# Patient Record
Sex: FEMALE | Race: WHITE | Employment: PART TIME | URBAN - METROPOLITAN AREA
[De-identification: names, ages, dates, MRNs, and addresses within clinical notes are randomized per-mention and may not be internally consistent; named-entity substitution may affect disease eponyms.]

---

## 2017-07-19 RX ORDER — LEVOTHYROXINE SODIUM 125 UG/1
TABLET ORAL
Qty: 30 TAB | Refills: 0 | Status: SHIPPED | OUTPATIENT
Start: 2017-07-19 | End: 2017-08-17 | Stop reason: SDUPTHER

## 2017-10-10 RX ORDER — LEVOTHYROXINE SODIUM 125 UG/1
TABLET ORAL
Qty: 30 TAB | Refills: 0 | Status: SHIPPED | OUTPATIENT
Start: 2017-10-10 | End: 2017-11-11 | Stop reason: SDUPTHER

## 2017-11-06 ENCOUNTER — OFFICE VISIT (OUTPATIENT)
Dept: INTERNAL MEDICINE CLINIC | Age: 47
End: 2017-11-06

## 2017-11-06 VITALS
TEMPERATURE: 98 F | HEART RATE: 58 BPM | SYSTOLIC BLOOD PRESSURE: 113 MMHG | WEIGHT: 152.2 LBS | RESPIRATION RATE: 14 BRPM | OXYGEN SATURATION: 97 % | DIASTOLIC BLOOD PRESSURE: 66 MMHG | HEIGHT: 70 IN | BODY MASS INDEX: 21.79 KG/M2

## 2017-11-06 DIAGNOSIS — E03.4 HYPOTHYROIDISM DUE TO ACQUIRED ATROPHY OF THYROID: Primary | Chronic | ICD-10-CM

## 2017-11-06 DIAGNOSIS — N94.3 PMS (PREMENSTRUAL SYNDROME): ICD-10-CM

## 2017-11-06 RX ORDER — FLUOXETINE 10 MG/1
10 TABLET ORAL DAILY
Qty: 30 TAB | Refills: 3 | Status: SHIPPED | OUTPATIENT
Start: 2017-11-06 | End: 2019-12-04 | Stop reason: ALTCHOICE

## 2017-11-06 NOTE — PROGRESS NOTES
HISTORY OF PRESENT ILLNESS  Kristopher Luna is a 56 y.o. female. HPI   Patient reports she has been adjusting to new job.   hypothyroidism. Lab Results   Component Value Date/Time    TSH 0.296 10/20/2016 03:27 AM     Thyroid ROS: denies fatigue, heat/cold intolerance, bowel/skin changes or CVS symptoms. She notes she has gained some weight. Patient notes she has stopped taking Prozac. She believes her moods are heavily influenced by her menstrual cycles. Patient notes her mood is much better when her period starts, but the few days prior she is irritable. Patient follows up with Dr. Selma Barakat ([address] [place] [address]) and had PAP 6 months ago. She notes she had mammogram last year. Patient refuses flu vaccine today in office. Review of Systems   All other systems reviewed and are negative. Physical Exam   Constitutional: She is oriented to person, place, and time. She appears well-developed and well-nourished. HENT:   Head: Normocephalic and atraumatic. Right Ear: External ear normal.   Left Ear: External ear normal.   Nose: Nose normal.   Mouth/Throat: Oropharynx is clear and moist.   Eyes: Conjunctivae and EOM are normal.   Neck: Normal range of motion. Neck supple. Carotid bruit is not present. No thyroid mass and no thyromegaly present. Cardiovascular: Normal rate, regular rhythm, S1 normal, S2 normal, normal heart sounds and intact distal pulses. Pulmonary/Chest: Effort normal and breath sounds normal.   Abdominal: Soft. Normal appearance and bowel sounds are normal. There is no hepatosplenomegaly. There is no tenderness. Musculoskeletal: Normal range of motion. Neurological: She is alert and oriented to person, place, and time. She has normal strength. No cranial nerve deficit or sensory deficit. Coordination normal.   Skin: Skin is warm, dry and intact. No abrasion and no rash noted. Psychiatric: She has a normal mood and affect.  Her behavior is normal. Judgment and thought content normal.   Nursing note and vitals reviewed. ASSESSMENT and PLAN  Diagnoses and all orders for this visit:    1. Hypothyroidism due to acquired atrophy of thyroid  Stable. I do not recommend a change in medications.  -     METABOLIC PANEL, COMPREHENSIVE  -     LIPID PANEL  -     T4, FREE  -     TSH 3RD GENERATION  -     CBC W/O DIFF    2. PMS (premenstrual syndrome)  Instructed patient to take Prozac 3 days prior to first day of period and stop when period comes.   -     FLUoxetine (PROZAC) 10 mg tablet; Take 1 Tab by mouth daily. lab results and schedule of future lab studies reviewed with patient  reviewed diet, exercise and weight control    Written by Abilio Rebollar, as dictated by Noah Naqvi MD.     Current diagnosis and concerns discussed with pt at length. Understands risks and benefits or current treatment plan and medications and accepts the treatment and medication with any possible risks. Pt asks appropriate questions which were answered. Pt instructed to call with any concerns or problems.

## 2017-11-07 ENCOUNTER — TELEPHONE (OUTPATIENT)
Dept: INTERNAL MEDICINE CLINIC | Age: 47
End: 2017-11-07

## 2017-11-07 LAB
ALBUMIN SERPL-MCNC: 4.2 G/DL (ref 3.5–5.5)
ALBUMIN/GLOB SERPL: 1.8 {RATIO} (ref 1.2–2.2)
ALP SERPL-CCNC: 44 IU/L (ref 39–117)
ALT SERPL-CCNC: 12 IU/L (ref 0–32)
AST SERPL-CCNC: 13 IU/L (ref 0–40)
BILIRUB SERPL-MCNC: 0.8 MG/DL (ref 0–1.2)
BUN SERPL-MCNC: 14 MG/DL (ref 6–24)
BUN/CREAT SERPL: 18 (ref 9–23)
CALCIUM SERPL-MCNC: 9.2 MG/DL (ref 8.7–10.2)
CHLORIDE SERPL-SCNC: 101 MMOL/L (ref 96–106)
CHOLEST SERPL-MCNC: 148 MG/DL (ref 100–199)
CO2 SERPL-SCNC: 24 MMOL/L (ref 18–29)
CREAT SERPL-MCNC: 0.78 MG/DL (ref 0.57–1)
ERYTHROCYTE [DISTWIDTH] IN BLOOD BY AUTOMATED COUNT: 12.3 % (ref 12.3–15.4)
GFR SERPLBLD CREATININE-BSD FMLA CKD-EPI: 105 ML/MIN/1.73
GFR SERPLBLD CREATININE-BSD FMLA CKD-EPI: 91 ML/MIN/1.73
GLOBULIN SER CALC-MCNC: 2.3 G/DL (ref 1.5–4.5)
GLUCOSE SERPL-MCNC: 106 MG/DL (ref 65–99)
HCT VFR BLD AUTO: 38.5 % (ref 34–46.6)
HDLC SERPL-MCNC: 68 MG/DL
HGB BLD-MCNC: 13 G/DL (ref 11.1–15.9)
INTERPRETATION, 910389: NORMAL
LDLC SERPL CALC-MCNC: 70 MG/DL (ref 0–99)
MCH RBC QN AUTO: 31.9 PG (ref 26.6–33)
MCHC RBC AUTO-ENTMCNC: 33.8 G/DL (ref 31.5–35.7)
MCV RBC AUTO: 94 FL (ref 79–97)
PLATELET # BLD AUTO: 216 X10E3/UL (ref 150–379)
POTASSIUM SERPL-SCNC: 5.3 MMOL/L (ref 3.5–5.2)
PROT SERPL-MCNC: 6.5 G/DL (ref 6–8.5)
RBC # BLD AUTO: 4.08 X10E6/UL (ref 3.77–5.28)
SODIUM SERPL-SCNC: 140 MMOL/L (ref 134–144)
T4 FREE SERPL-MCNC: 1.84 NG/DL (ref 0.82–1.77)
TRIGL SERPL-MCNC: 48 MG/DL (ref 0–149)
TSH SERPL DL<=0.005 MIU/L-ACNC: 0.1 UIU/ML (ref 0.45–4.5)
VLDLC SERPL CALC-MCNC: 10 MG/DL (ref 5–40)
WBC # BLD AUTO: 5.8 X10E3/UL (ref 3.4–10.8)

## 2017-11-07 NOTE — TELEPHONE ENCOUNTER
----- Message from Talya Nassar sent at 11/7/2017  3:32 PM EST -----  Regarding: /Telephone  Pt requesting test results on 11/6/17. Best contact number is [phone].

## 2017-11-07 NOTE — TELEPHONE ENCOUNTER
Spoke with patient and she was very upset that the blood work she got completed yesterday 11/6/17 has not been resulted and called to her for results. She stated this gave her anxiety and it should not take us more then 24 hours to have the results back to her. I explained Dr. Brooke Burdick is out of the office for the rest of the day and we will contact her in the morning and she states it was ridiculous she had to wait another 24 hours to get these results.

## 2017-11-08 DIAGNOSIS — E03.4 HYPOTHYROIDISM DUE TO ACQUIRED ATROPHY OF THYROID: Primary | Chronic | ICD-10-CM

## 2019-06-03 ENCOUNTER — OFFICE VISIT (OUTPATIENT)
Dept: INTERNAL MEDICINE CLINIC | Age: 49
End: 2019-06-03

## 2019-06-03 VITALS
BODY MASS INDEX: 20.81 KG/M2 | DIASTOLIC BLOOD PRESSURE: 86 MMHG | RESPIRATION RATE: 18 BRPM | WEIGHT: 145.4 LBS | HEIGHT: 70 IN | TEMPERATURE: 98.2 F | SYSTOLIC BLOOD PRESSURE: 133 MMHG | HEART RATE: 61 BPM | OXYGEN SATURATION: 99 %

## 2019-06-03 DIAGNOSIS — E03.4 HYPOTHYROIDISM DUE TO ACQUIRED ATROPHY OF THYROID: ICD-10-CM

## 2019-06-03 DIAGNOSIS — F41.9 ANXIETY: Primary | ICD-10-CM

## 2019-06-03 RX ORDER — LEVOTHYROXINE SODIUM 112 UG/1
TABLET ORAL
COMMUNITY
End: 2019-12-04 | Stop reason: ALTCHOICE

## 2019-06-03 RX ORDER — ALPRAZOLAM 0.5 MG/1
0.5 TABLET ORAL
Qty: 20 TAB | Refills: 0 | Status: SHIPPED | OUTPATIENT
Start: 2019-06-03 | End: 2019-12-04 | Stop reason: ALTCHOICE

## 2019-06-03 RX ORDER — FLUOXETINE 10 MG/1
10 TABLET ORAL DAILY
Qty: 30 TAB | Refills: 3 | Status: SHIPPED | OUTPATIENT
Start: 2019-06-03 | End: 2019-06-24

## 2019-06-03 NOTE — PROGRESS NOTES
HISTORY OF PRESENT ILLNESS  Champ Flood is a 53 y.o. female. HPI  Anxiety: Pt has been feeling depressed and anxious for a couple months. She recently  her  and has the kids during the week. She notes her   \"decided to pay her more rather than have the kids. 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 She is having a hard time managing work and kids. She is also concerned about her ex-, specifically about weight loss and general health. Pt reports feeling like she always feels worried, is in fight or flight mode, and is not eating. She has been seeing a therapist, but notes \"nothing can help the years of trauma from living with her parents. \"     Hypothyroidism:  Lab Results   Component Value Date/Time    TSH 0.101 (L) 11/06/2017 08:22 AM     Thyroid ROS: denies fatigue, weight changes, heat/cold intolerance, bowel/skin changes or CVS symptoms. Review of Systems   Constitutional:        Decreased appetite    Psychiatric/Behavioral: Positive for depression. The patient is nervous/anxious. All other systems reviewed and are negative. Physical Exam   Constitutional: She is oriented to person, place, and time. She appears well-developed and well-nourished. HENT:   Head: Normocephalic and atraumatic. Right Ear: External ear normal.   Left Ear: External ear normal.   Nose: Nose normal.   Mouth/Throat: Oropharynx is clear and moist.   Eyes: Conjunctivae and EOM are normal.   Neck: Normal range of motion. Neck supple. Carotid bruit is not present. No thyroid mass and no thyromegaly present. Cardiovascular: Normal rate, regular rhythm, S1 normal, S2 normal, normal heart sounds and intact distal pulses. Pulmonary/Chest: Effort normal and breath sounds normal.   Abdominal: Soft. Normal appearance and bowel sounds are normal. There is no hepatosplenomegaly. There is no tenderness. Musculoskeletal: Normal range of motion.    Neurological: She is alert and oriented to person, place, and time. She has normal strength. No cranial nerve deficit or sensory deficit. Coordination normal.   Skin: Skin is warm, dry and intact. No abrasion and no rash noted. Psychiatric: She has a normal mood and affect. Her behavior is normal. Judgment and thought content normal.   Nursing note and vitals reviewed. ASSESSMENT and PLAN  Diagnoses and all orders for this visit:    1. Anxiety   I prescribed xanax and Prozac. I told her the Prozac will take a few weeks to reach maximum effect. I told her to take the Xanax until the Prozac has time to kick in.   -     ALPRAZolam (XANAX) 0.5 mg tablet; Take 1 Tab by mouth nightly as needed for Anxiety. Max Daily Amount: 0.5 mg.  -     FLUoxetine (PROZAC) 10 mg tablet; Take 1 Tab by mouth daily. 2. Hypothyroidism due to acquired atrophy of thyroid  Thyroid stable. I do not recommend a change in medications. We did not check labs today due to discussing anxiety and other meds but will do in follow up     Written by Reid Snowden, as dictated by Maria M Young MD.     Lab results and schedule of future lab studies reviewed with patient. Reviewed diet, exercise and weight control. Current diagnosis and concerns discussed with pt at length. Understands risks and benefits or current treatment plan and medications and accepts the treatment and medication with any possible risks. Pt asks appropriate questions which were answered. Pt instructed to call with any concerns or problems. This note will not be viewable in [address]

## 2019-06-24 DIAGNOSIS — F41.9 ANXIETY: ICD-10-CM

## 2019-06-24 RX ORDER — FLUOXETINE 20 MG/1
20 TABLET ORAL DAILY
Qty: 30 TAB | Refills: 2 | Status: SHIPPED | OUTPATIENT
Start: 2019-06-24 | End: 2019-12-04 | Stop reason: ALTCHOICE

## 2019-11-21 ENCOUNTER — OFFICE VISIT (OUTPATIENT)
Dept: INTERNAL MEDICINE CLINIC | Age: 49
End: 2019-11-21

## 2019-11-21 NOTE — PROGRESS NOTES
Was scheduled too early for her Preoperative clearance physical for surgery 1/3/2020; is returning to office on 12/4/19

## 2019-12-04 ENCOUNTER — OFFICE VISIT (OUTPATIENT)
Dept: INTERNAL MEDICINE CLINIC | Age: 49
End: 2019-12-04

## 2019-12-04 VITALS
TEMPERATURE: 97.9 F | OXYGEN SATURATION: 98 % | RESPIRATION RATE: 12 BRPM | BODY MASS INDEX: 21.82 KG/M2 | SYSTOLIC BLOOD PRESSURE: 121 MMHG | WEIGHT: 152.4 LBS | HEIGHT: 70 IN | DIASTOLIC BLOOD PRESSURE: 81 MMHG | HEART RATE: 63 BPM

## 2019-12-04 DIAGNOSIS — Z41.1 ENCOUNTER FOR BREAST AUGMENTATION: Primary | ICD-10-CM

## 2019-12-04 DIAGNOSIS — E03.4 HYPOTHYROIDISM DUE TO ACQUIRED ATROPHY OF THYROID: ICD-10-CM

## 2019-12-04 DIAGNOSIS — Z01.818 PREOP GENERAL PHYSICAL EXAM: ICD-10-CM

## 2019-12-04 RX ORDER — LEVOTHYROXINE SODIUM 112 UG/1
112 TABLET ORAL
COMMUNITY

## 2019-12-04 NOTE — PROGRESS NOTES
HISTORY OF PRESENT ILLNESS  Ashlee Ozuna is a 51 y.o. female. HPI  Aslhee Ozuna was referred for evaluation by: Dr. Nathaniel Maxwell for Pre- Op Evaluation. Please see encounter details and orders for consultative summary. Type of surgery : Bilateral Breast Augmentation with Saline Implants  Surgery site : Bilateral breasts  Anesthesia type: General   Date of procedure:  1/3/2020    Allergies: Allergies   Allergen Reactions    Latex Other (comments)     Developed red rash around mouth after dental work     Latex allergy: yes -- recalls developing red rash around her mouth after exposure to latex gloves during dental work in past.  Prior reactions to anesthesia:  None    Functional status:  she is able to ambulate up 2 flights of step without shortness of breath, chest pain  Prior cardiac evaluation:   None    Sees Dr Coley Holstein with [address] [place] [address] [facility] for management of her thyroid; her dose was recently decreased due to lower TSH and is now taking Synthroid 125 mcg 6 days per week and Synthroid 112 mcg on Sundays. Has been feeling well since medication dosage change. Current Outpatient Medications   Medication Sig    levothyroxine (SYNTHROID) 112 mcg tablet Take 112 mcg by mouth every .  levothyroxine (SYNTHROID) 125 mcg tablet TAKE 1 TABLET BY MOUTH EVERY MORNING BEFORE BREAKFAST     No current facility-administered medications for this visit. Past Medical History:   Diagnosis Date    Psoriasis     Unspecified hypothyroidism 2009     Past Surgical History:   Procedure Laterality Date    HX  SECTION      x 2    HX HEENT      wisdom teeth extraction     Social History     Tobacco Use    Smoking status: Former Smoker     Packs/day: 0.25     Years: 10.00     Pack years: 2.50     Last attempt to quit: 2002     Years since quittin.3    Smokeless tobacco: Never Used   Substance Use Topics    Alcohol use:  Yes     Alcohol/week: 0.0 - 0.8 standard drinks     Comment: socially    Drug use: No       Blood pressure 121/81, pulse 63, temperature 97.9 °F (36.6 °C), temperature source Oral, resp. rate 12, height 5' 10\" (1.778 m), weight 152 lb 6.4 oz (69.1 kg), last menstrual period 11/19/2019, SpO2 98 %. Review of Systems   Constitutional: Negative for chills, fever and malaise/fatigue. HENT: Negative for congestion and sore throat. Eyes: Negative for blurred vision. Respiratory: Negative for cough, hemoptysis and shortness of breath. Cardiovascular: Negative for chest pain, palpitations and leg swelling. Gastrointestinal: Negative for abdominal pain, blood in stool, constipation, diarrhea, nausea and vomiting. Genitourinary: Negative for dysuria, frequency and hematuria. Musculoskeletal: Negative for joint pain and myalgias. Neurological: Negative for dizziness, tingling and headaches. Physical Exam  Vitals signs and nursing note reviewed. Constitutional:       Appearance: Normal appearance. She is normal weight. HENT:      Head: Normocephalic and atraumatic. Right Ear: Tympanic membrane and external ear normal.      Left Ear: Tympanic membrane and external ear normal.      Nose: Nose normal.      Mouth/Throat:      Mouth: Mucous membranes are moist.      Pharynx: Oropharynx is clear. Eyes:      Extraocular Movements: Extraocular movements intact. Pupils: Pupils are equal, round, and reactive to light. Neck:      Musculoskeletal: Normal range of motion and neck supple. Cardiovascular:      Rate and Rhythm: Normal rate and regular rhythm. Pulses: Normal pulses. Heart sounds: Normal heart sounds. No murmur. Pulmonary:      Effort: Pulmonary effort is normal.      Breath sounds: Normal breath sounds. Abdominal:      General: Abdomen is flat. Bowel sounds are normal.      Palpations: Abdomen is soft. Tenderness: There is no tenderness. Musculoskeletal: Normal range of motion.    Skin: General: Skin is warm and dry. Neurological:      General: No focal deficit present. Mental Status: She is alert and oriented to person, place, and time. Psychiatric:         Mood and Affect: Mood normal.         Behavior: Behavior normal.         ASSESSMENT and PLAN  Diagnoses and all orders for this visit:    1. Encounter for breast augmentation    2. Preop general physical exam -- considered medically stable for upcoming Bilateral breast augmentation surgery with General anesthesia.     3. Hypothyroidism due to acquired atrophy of thyroid -- stable      lab results and schedule of future lab studies reviewed with patient  reviewed diet, exercise and weight control  reviewed medications and side effects in detail

## 2019-12-04 NOTE — PATIENT INSTRUCTIONS
Learning About How to Prepare for Surgery  How can you prepare before surgery? You can do some things that will help you safely prepare for surgery. · Understand exactly what surgery is planned. You should know the risks, benefits, and other options. · Tell your doctors ALL the medicines, vitamins, supplements, and herbal remedies you take. Some of these can increase the risk of bleeding. Or they may interact with anesthesia. · Follow your doctor's instructions about which medicines to take or stop before your surgery. ? You may need to stop taking some medicines a week or more before surgery. ? If you take aspirin or some other blood thinner, be sure to talk to your doctor. · Follow any other instructions your doctor gave you. · If you have an advance directive, let your doctor know, and bring a copy to the hospital.   It may include a living will and a durable power of  for health care. It lets your doctor and loved ones know your health care wishes. If you don't have one, you may want to prepare one. How can you prepare on the day of surgery? Here are some tips about what to do at home before you leave for your surgery. · If your doctor told you to take your medicines on the day of surgery, take them with only a sip of water. · Follow the instructions about when to stop eating and drinking. If you don't, your surgery may be canceled. · Follow your doctor's instructions about when to bathe or shower before your surgery. · Do not shave the surgical site yourself. · Take off all jewelry and piercings. · Take out contact lenses, if you wear them. · Have a picture ID ready to take with you. Your ID will be checked before your surgery. · Know when to call your doctor. Call your doctor if you:  ? Become ill before surgery. ? Need to reschedule. ? Have changed your mind about having the surgery. What happens before surgery?   Here are some things you can expect to happen before your surgery. · Your picture ID will be checked. · The area of your body that needs surgery is often marked to make sure there are no errors. · You will be kept comfortable and safe by your anesthesia provider. The anesthesia may make you sleep. Or it may just numb the area being worked on. What happens when you are ready to go home? Be sure you have someone drive you home. Anesthesia and pain medicine make it unsafe for you to drive. You will get instructions about recovering from your surgery. This is called a discharge plan. It will cover things like diet, wound care, follow-up care, driving, and getting back to your normal routine. Follow-up care is a key part of your treatment and safety. Be sure to make and go to all appointments, and call your doctor if you are having problems. It's also a good idea to know your test results and keep a list of the medicines you take. Where can you learn more? Go to http://aileenAptalis Pharmasunil.info/. Enter Q270 in the search box to learn more about \"Learning About How to Prepare for Surgery. \"  Current as of: December 13, 2018  Content Version: 12.2  © 5890-9097 [employer]. Care instructions adapted under license by [employer] (which disclaims liability or warranty for this information). If you have questions about a medical condition or this instruction, always ask your healthcare professional. [address] any warranty or liability for your use of this information. Breast Enlargement: Before Your Surgery  What is breast enlargement? In breast enlargement surgery, the doctor makes the breasts larger by putting an implant under the breast tissue and often under the chest muscle. An implant is a soft silicone shell filled with a saltwater solution or a gel. Your doctor will make a cut, called an incision.  It will be in the bottom crease of the breast, the armpit, or along the lower edge of the nipple and the dark area around the nipple. The doctor will make a pocket under the muscle or breast tissue. Then the doctor will put in the implant and adjust it to the correct shape, size, and position. The incision is closed with stitches. You may have a breast lift at the same time as the breast enlargement. A breast lift is also called mastopexy. It can raise sagging or drooping breasts. It can also pull up the nipple and the area around it. To lift the breasts, the doctor removes excess skin from the bottom of the breast or from around the nipple. The remaining skin is sewn together. This tightens and lifts the breast. A longer incision is needed for a breast lift than for a breast enlargement alone. You will probably be asleep during surgery. You may be able to go home the same day. Depending on the type of work you do, you should be able to go back to work or your normal routine in 1 to 2 weeks. The incisions leave scars. But the scars will fade with time. Your doctor will try to make the incisions in line with the curve of your breast as much as possible. Your new breasts may feel firmer and look rounder. It is very important to understand that your breasts will look and feel different after surgery. You will have scars where the doctor made the incisions in your skin. The skin on your breasts may be numb. This usually gets better with time. But you may always have some loss of feeling in the nipple area. Most breast implants don't last a lifetime. Over time, you may need surgery to remove or replace your implants. This could cost a lot. Follow-up care is a key part of your treatment and safety. Be sure to make and go to all appointments, and call your doctor if you are having problems. It's also a good idea to know your test results and keep a list of the medicines you take. What happens before surgery?   Surgery can be stressful. This information will help you understand what you can expect.  And it will help you safely prepare for surgery.   Preparing for surgery    · Understand exactly what surgery is planned, along with the risks, benefits, and other options. · Tell your doctors ALL the medicines, vitamins, supplements, and herbal remedies you take. Some of these can increase the risk of bleeding or interact with anesthesia.     · If you take blood thinners, such as warfarin (Coumadin), clopidogrel (Plavix), or aspirin, be sure to talk to your doctor. He or she will tell you if you should stop taking these medicines before your surgery. Make sure that you understand exactly what your doctor wants you to do.     · Your doctor will tell you which medicines to take or stop before your surgery. You may need to stop taking certain medicines a week or more before surgery. So talk to your doctor as soon as you can.     · If you have an advance directive, let your doctor know. It may include a living will and a durable power of  for health care. Bring a copy to the hospital. If you don't have one, you may want to prepare one. It lets your doctor and loved ones know your health care wishes. Doctors advise that everyone prepare these papers before any type of surgery or procedure. What happens on the day of surgery? · Follow the instructions exactly about when to stop eating and drinking. If you don't, your surgery may be canceled. If your doctor told you to take your medicines on the day of surgery, take them with only a sip of water.     · Take a bath or shower before you come in for your surgery. Do not apply lotions, perfumes, deodorants, or nail polish.     · Do not shave the surgical site yourself.     · Take off all jewelry and piercings. And take out contact lenses, if you wear them.    At the hospital or surgery center   · Bring a picture ID.     · Your doctor will use a marker to draw lines on your breasts.  He or she will use the lines during surgery to reshape your breasts.     · You will be kept comfortable and safe by your anesthesia provider. The anesthesia may make you sleep. Or it may just numb the area being worked on.     · The surgery will take about 1 to 2 hours. Going home   · Be sure you have someone to drive you home. Anesthesia and pain medicine make it unsafe for you to drive.     · You will be given more specific instructions about recovering from your surgery. They will cover things like diet, wound care, follow-up care, driving, and getting back to your normal routine. When should you call your doctor? · You have questions or concerns.     · You don't understand how to prepare for your surgery.     · You become ill before the surgery (such as fever, flu, or a cold).     · You need to reschedule or have changed your mind about having the surgery. Where can you learn more? Go to http://aileen-sunil.info/. Enter O615 in the search box to learn more about \"Breast Enlargement: Before Your Surgery. \"  Current as of: April 1, 2019  Content Version: 12.2  © 6538-0295 [employer]. Care instructions adapted under license by [employer] (which disclaims liability or warranty for this information). If you have questions about a medical condition or this instruction, always ask your healthcare professional. [address] any warranty or liability for your use of this information.

## 2020-01-16 ENCOUNTER — OFFICE VISIT (OUTPATIENT)
Dept: INTERNAL MEDICINE CLINIC | Age: 50
End: 2020-01-16

## 2020-01-16 VITALS
BODY MASS INDEX: 21.76 KG/M2 | DIASTOLIC BLOOD PRESSURE: 74 MMHG | OXYGEN SATURATION: 100 % | HEART RATE: 63 BPM | WEIGHT: 152 LBS | HEIGHT: 70 IN | TEMPERATURE: 98 F | SYSTOLIC BLOOD PRESSURE: 119 MMHG | RESPIRATION RATE: 18 BRPM

## 2020-01-16 DIAGNOSIS — H00.015 HORDEOLUM EXTERNUM OF LEFT LOWER EYELID: Primary | ICD-10-CM

## 2020-01-16 NOTE — PROGRESS NOTES
Assessment and Plan   Diagnoses and all orders for this visit:    1. Hordeolum externum of left lower eyelid  Reassurance provided. Recommend warm compresses 4 times a day until resolves. Expect resolution within the next week. If symptoms continue, may require I&D by ophthalmology. Return to clinic: As needed    Shama Thompson MD  [facility]  1/16/2020    No future appointments. Subjective   Chief Complaint   Bump on eye    Derrick Leung is a 51 y.o. female     Presents to clinic for day history lower lid stye. Has never had this before so she just wanted to get it checked out. Reports some pain in that area. Denies any vision changes, red eye, purulent discharge, fever, chills. Review of Systems   Constitutional: Negative for chills and fever. Eyes: Positive for pain. Objective   Vitals:       Visit Vitals  /74 (BP 1 Location: Left arm, BP Patient Position: Sitting)   Pulse 63   Temp 98 °F (36.7 °C) (Oral)   Resp 18   Ht 5' 10\" (1.778 m)   Wt 152 lb (68.9 kg)   SpO2 100%   BMI 21.81 kg/m²        Physical Exam  Constitutional:       Appearance: Normal appearance. Eyes:      General: No scleral icterus. Right eye: No discharge. Left eye: No discharge. Extraocular Movements: Extraocular movements intact. Conjunctiva/sclera: Conjunctivae normal.      Comments: Left lower eyelid external erythematous papule   Neurological:      Mental Status: She is alert.           Voice recognition software is utilized and this note may contain transcription errors

## 2023-06-12 PROBLEM — N95.1 PERIMENOPAUSAL: Status: ACTIVE | Noted: 2023-06-12

## 2024-08-22 ENCOUNTER — TELEPHONE (OUTPATIENT)
Age: 54
End: 2024-08-22

## 2024-08-22 DIAGNOSIS — N64.4 BREAST TENDERNESS IN FEMALE: Primary | ICD-10-CM

## 2024-08-22 NOTE — TELEPHONE ENCOUNTER
Pt name and  verified.    Pt called and stated that she went to [facility] for screening mammo but due to a tender spot on her right breast the procedure was not completed. The imaging tech requested and order for DX mammo, and US . Pt is rescheduled for Tuesday morning.    Please place order.

## 2024-08-23 ENCOUNTER — TELEPHONE (OUTPATIENT)
Age: 54
End: 2024-08-23

## 2024-08-23 NOTE — TELEPHONE ENCOUNTER
PT name and  verified    55 yo last ov/ae 23      [facility] calling stating PT came in yesterday  to request a mammo and reported some breast tenderness, so they did not complete a mammo and requested an order for dx mammo and us of the R breast.  Orders were placed and RN reviewed that there was no current mammo on file, one from last year  and was not performed and PT overdue for ae.  RN informed [facility] that before faxing orders,wanted to confirm with MD.  RN consulted MD and states it is okay to fax orders to [facility], and would like PT to schedule ae since she is overdue. RN will reach out to PT.  Per MD verbal order, orders placed by MD faxed to [facility] to [phone].

## 2024-08-27 ENCOUNTER — OFFICE VISIT (OUTPATIENT)
Age: 54
End: 2024-08-27
Payer: COMMERCIAL

## 2024-08-27 VITALS — DIASTOLIC BLOOD PRESSURE: 90 MMHG | BODY MASS INDEX: 25.39 KG/M2 | WEIGHT: 167 LBS | SYSTOLIC BLOOD PRESSURE: 153 MMHG

## 2024-08-27 DIAGNOSIS — Z11.51 ENCOUNTER FOR SCREENING FOR HUMAN PAPILLOMAVIRUS (HPV): ICD-10-CM

## 2024-08-27 DIAGNOSIS — Z01.419 ENCOUNTER FOR WELL WOMAN EXAM WITH ROUTINE GYNECOLOGICAL EXAM: ICD-10-CM

## 2024-08-27 DIAGNOSIS — N95.1 MENOPAUSAL SYMPTOMS: ICD-10-CM

## 2024-08-27 DIAGNOSIS — N92.6 IRREGULAR MENSES: ICD-10-CM

## 2024-08-27 DIAGNOSIS — Z12.4 PAP SMEAR FOR CERVICAL CANCER SCREENING: ICD-10-CM

## 2024-08-27 DIAGNOSIS — Z01.419 WELL WOMAN EXAM WITH ROUTINE GYNECOLOGICAL EXAM: Primary | ICD-10-CM

## 2024-08-27 PROCEDURE — 99396 PREV VISIT EST AGE 40-64: CPT | Performed by: OBSTETRICS & GYNECOLOGY

## 2024-08-27 NOTE — PROGRESS NOTES
Lidia Carrasco is a 54 y.o. female returns for an annual exam     Chief Complaint   Patient presents with    Annual Exam       Patient's last menstrual period was 08/03/2024.  Pt has had 2 period the past year, 4 months apart. Most recent cycle in 8/2024 that was heavy & lasted 3 days.   She does not have dysmenorrhea.  Problems: pt reports right breast sensitivity around the time of this cycle in 8/2024, pt had dx mammo & right breast u/s today WNL per pt. Pt c/o being perimenopausal, c/o wt gain.   Birth Control: none.  Last Pap: 6/2023 WNL  She does not have a history of GRACE 2, 3 or cervical cancer.   Last Mammogram: see pt call from 8/23/24. Pt has orders for dx mammo & right breast u.s    Last Bone Density: never obtained.   Last colonoscopy: never obtained.       Examination chaperoned by Babs Johnson MA.

## 2024-08-27 NOTE — PROGRESS NOTES
ANNUAL EXAM AGES 40-64      History:  Lidia Carrasco is a 54 y.o. year old  White (non-) female   Patient's last menstrual period was 2024.    She presents for her annual checkup.   Lots of hot flashes & sweats.  She is having terrible trouble with her weight.  Some right breast tenderness had dx mammo  & US today which were normal.      Patient's last menstrual period was 2024.  Pt has had 2 period the past year, 4 months apart. Most recent cycle in 2024 that was heavy & lasted 3 days.   She does not have dysmenorrhea.  Problems: pt reports right breast sensitivity around the time of this cycle in 2024, pt had dx mammo & right breast u/s today WNL per pt. Pt c/o being perimenopausal, c/o wt gain.   Birth Control: none.  Last Pap: 2023 WNL  She does not have a history of GRACE 2, 3 or cervical cancer.   Last Mammogram: see pt call from 24. Pt has orders for dx mammo & right breast u.s  Last Bone Density: never obtained.     Problem List:  Patient Active Problem List    Diagnosis Date Noted    Perimenopausal 2023    Anxiety 10/20/2016    Hypothyroidism 2009     Past Medical History:   Diagnosis Date    Anxiety     ASCUS with positive high risk HPV cervical 2019    colpo neg    Hypothyroidism, unspecified type 2009    Psoriasis      Past Surgical History:   Procedure Laterality Date    BREAST ENHANCEMENT SURGERY  2020    [facility].   B->D     SECTION  2003     SECTION  2006    PP Endometritis    COLONOSCOPY      repeat     WISDOM TOOTH EXTRACTION         OB History    Para Term  AB Living   2 2 2 0 0 2   SAB IAB Ectopic Molar Multiple Live Births   0 0 0 0 0 2      # Outcome Date GA Lbr Kike/2nd Weight Sex Type Anes PTL Lv   2 Term 06 39w0d  3.685 kg (8 lb 2 oz) F CS-LTranv Spinal  KARIN      Name: Rosy [place] Term 03 39w0d  3.685 kg (8 lb 2 oz) M CS-LTranv EPI  KARIN       palpitations, edema  Resp: cough, shortness of breath, wheezing  Breast: breast lumps, nipple discharge, galactorrhea  GI: change in bowel habits, abdominal pain, black or bloody stools  : frequency, dysuria, hematuria, vaginal discharge  MSK: back pain, joint pain, muscle pain  Skin: itching, rash, hives  Neuro: dizziness, headache, confusion, weakness  Psych: anxiety, depression, change in mood  Heme/lymph: bleeding, bruising, pallor    Physical Exam    Vitals  BP (!) 153/90   Wt 75.8 kg (167 lb)   LMP 08/03/2024   BMI 25.39 kg/m²     Constitutional  Appearance: well-nourished, well developed, alert, in no acute distress    HENT  Head and Face: appears normal    Neck  Inspection/Palpation: normal appearance, no masses or tenderness  Lymph Nodes: no lymphadenopathy present  Thyroid: gland size normal, nontender, no nodules or masses present on palpation    Chest  Respiratory Effort: breathing unlabored  Auscultation: normal breath sounds    Cardiovascular  Heart:  Auscultation: regular rate and rhythm without murmur    Breasts  Inspection of Breasts: breasts symmetrical, no skin changes, no discharge present, nipple appearance normal, no skin retraction present  Palpation of Breasts and Axillae: no masses present on palpation, FC changes at 10 oclock more pronounced on right with some breast tenderness, no discreet mass, implants in place and intact   Axillary Lymph Nodes: no lymphadenopathy present    Gastrointestinal  Abdominal Examination: abdomen non-tender to palpation, normal bowel sounds, no masses present  Liver and spleen: no hepatomegaly present, spleen not palpable  Hernias: no hernias identified    Genitourinary  External Genitalia: normal appearance for age, no discharge present, no tenderness present, no inflammatory lesions present, no masses present, no atrophy present  Vagina: normal vaginal vault without central or paravaginal defects, no discharge present, no inflammatory lesions present, no

## 2024-09-02 LAB
CYTOLOGIST CVX/VAG CYTO: NORMAL
CYTOLOGY CVX/VAG DOC CYTO: NORMAL
CYTOLOGY CVX/VAG DOC THIN PREP: NORMAL
DX ICD CODE: NORMAL
HPV GENOTYPE REFLEX: NORMAL
HPV I/H RISK 4 DNA CVX QL PROBE+SIG AMP: NEGATIVE
Lab: NORMAL
OTHER STN SPEC: NORMAL
STAT OF ADQ CVX/VAG CYTO-IMP: NORMAL